# Patient Record
Sex: FEMALE | Race: WHITE | NOT HISPANIC OR LATINO
[De-identification: names, ages, dates, MRNs, and addresses within clinical notes are randomized per-mention and may not be internally consistent; named-entity substitution may affect disease eponyms.]

---

## 2017-11-17 ENCOUNTER — APPOINTMENT (OUTPATIENT)
Dept: FAMILY MEDICINE | Facility: CLINIC | Age: 37
End: 2017-11-17
Payer: COMMERCIAL

## 2017-11-17 VITALS
BODY MASS INDEX: 24.55 KG/M2 | DIASTOLIC BLOOD PRESSURE: 70 MMHG | HEIGHT: 61 IN | WEIGHT: 130 LBS | SYSTOLIC BLOOD PRESSURE: 110 MMHG

## 2017-11-17 LAB — CYTOLOGY CVX/VAG DOC THIN PREP: NORMAL

## 2017-11-17 PROCEDURE — 36415 COLL VENOUS BLD VENIPUNCTURE: CPT

## 2017-11-17 PROCEDURE — 99385 PREV VISIT NEW AGE 18-39: CPT | Mod: 25

## 2017-11-19 LAB
ALBUMIN SERPL ELPH-MCNC: 4.3 G/DL
ALP BLD-CCNC: 49 U/L
ALT SERPL-CCNC: 5 U/L
ANION GAP SERPL CALC-SCNC: 13 MMOL/L
APPEARANCE: CLEAR
AST SERPL-CCNC: 12 U/L
BACTERIA: ABNORMAL
BASOPHILS # BLD AUTO: 0.01 K/UL
BASOPHILS NFR BLD AUTO: 0.2 %
BILIRUB SERPL-MCNC: 0.6 MG/DL
BILIRUBIN URINE: NEGATIVE
BLOOD URINE: NEGATIVE
BUN SERPL-MCNC: 12 MG/DL
CALCIUM SERPL-MCNC: 9.8 MG/DL
CHLORIDE SERPL-SCNC: 104 MMOL/L
CHOLEST SERPL-MCNC: 178 MG/DL
CHOLEST/HDLC SERPL: 2.2 RATIO
CO2 SERPL-SCNC: 26 MMOL/L
COLOR: YELLOW
CREAT SERPL-MCNC: 0.72 MG/DL
EOSINOPHIL # BLD AUTO: 0.05 K/UL
EOSINOPHIL NFR BLD AUTO: 0.9 %
GLUCOSE QUALITATIVE U: NEGATIVE MG/DL
GLUCOSE SERPL-MCNC: 100 MG/DL
HBA1C MFR BLD HPLC: 5.1 %
HCT VFR BLD CALC: 40.4 %
HDLC SERPL-MCNC: 81 MG/DL
HGB BLD-MCNC: 12.8 G/DL
HIV1+2 AB SPEC QL IA.RAPID: NONREACTIVE
HYALINE CASTS: 0 /LPF
IMM GRANULOCYTES NFR BLD AUTO: 0.2 %
KETONES URINE: NEGATIVE
LDLC SERPL CALC-MCNC: 88 MG/DL
LEUKOCYTE ESTERASE URINE: NEGATIVE
LYMPHOCYTES # BLD AUTO: 2.11 K/UL
LYMPHOCYTES NFR BLD AUTO: 39.1 %
MAN DIFF?: NORMAL
MCHC RBC-ENTMCNC: 29 PG
MCHC RBC-ENTMCNC: 31.7 GM/DL
MCV RBC AUTO: 91.6 FL
MICROSCOPIC-UA: NORMAL
MONOCYTES # BLD AUTO: 0.29 K/UL
MONOCYTES NFR BLD AUTO: 5.4 %
NEUTROPHILS # BLD AUTO: 2.93 K/UL
NEUTROPHILS NFR BLD AUTO: 54.2 %
NITRITE URINE: NEGATIVE
PH URINE: 6.5
PLATELET # BLD AUTO: 186 K/UL
POTASSIUM SERPL-SCNC: 4.3 MMOL/L
PROT SERPL-MCNC: 7.3 G/DL
PROTEIN URINE: NEGATIVE MG/DL
RBC # BLD: 4.41 M/UL
RBC # FLD: 12.8 %
RED BLOOD CELLS URINE: 1 /HPF
SODIUM SERPL-SCNC: 143 MMOL/L
SPECIFIC GRAVITY URINE: 1.01
SQUAMOUS EPITHELIAL CELLS: 16 /HPF
TRIGL SERPL-MCNC: 44 MG/DL
TSH SERPL-ACNC: 0.73 UIU/ML
UROBILINOGEN URINE: NEGATIVE MG/DL
WBC # FLD AUTO: 5.4 K/UL
WHITE BLOOD CELLS URINE: 2 /HPF

## 2018-09-27 ENCOUNTER — APPOINTMENT (OUTPATIENT)
Dept: FAMILY MEDICINE | Facility: CLINIC | Age: 38
End: 2018-09-27
Payer: COMMERCIAL

## 2018-09-27 VITALS
DIASTOLIC BLOOD PRESSURE: 70 MMHG | SYSTOLIC BLOOD PRESSURE: 100 MMHG | HEIGHT: 61 IN | WEIGHT: 128 LBS | BODY MASS INDEX: 24.17 KG/M2

## 2018-09-27 PROCEDURE — 99213 OFFICE O/P EST LOW 20 MIN: CPT

## 2018-11-16 ENCOUNTER — APPOINTMENT (OUTPATIENT)
Dept: FAMILY MEDICINE | Facility: CLINIC | Age: 38
End: 2018-11-16
Payer: COMMERCIAL

## 2018-11-16 VITALS
DIASTOLIC BLOOD PRESSURE: 70 MMHG | SYSTOLIC BLOOD PRESSURE: 110 MMHG | WEIGHT: 128.19 LBS | BODY MASS INDEX: 24.2 KG/M2 | HEIGHT: 61 IN

## 2018-11-16 PROCEDURE — 99213 OFFICE O/P EST LOW 20 MIN: CPT

## 2018-11-16 NOTE — PHYSICAL EXAM
[Well Nourished] : well nourished [Regular Rhythm] : with a regular rhythm [Normal S1, S2] : normal S1 and S2 [Normal Gait] : normal gait [Normal Affect] : the affect was normal [Normal Insight/Judgement] : insight and judgment were intact [de-identified] : coarse

## 2018-11-16 NOTE — ASSESSMENT
[FreeTextEntry1] : 38 year old female  with diagnosis of upper respiratory infection. Patient was prescribed antibiotics, however was advised to hold and monitor symptoms.  If symptoms progress or worsen, patient was instructed to start taking the medications.  Once starting medications, patient was advised to finish entire course of antibiotics.  Patient was advised to drink plenty of fluids, rest and to call office or go to the ER immediately if any worsening of symptoms occur.\par

## 2018-11-16 NOTE — HISTORY OF PRESENT ILLNESS
[FreeTextEntry8] : 38 year old female here with complaints of cough, congestion and fever for a few hours.  Patients active medications, allergies and issues were all reviewed with the patient at time of visit.\par

## 2020-03-05 ENCOUNTER — APPOINTMENT (OUTPATIENT)
Dept: OBGYN | Facility: CLINIC | Age: 40
End: 2020-03-05

## 2020-03-19 ENCOUNTER — APPOINTMENT (OUTPATIENT)
Dept: ANTEPARTUM | Facility: CLINIC | Age: 40
End: 2020-03-19

## 2020-04-02 ENCOUNTER — APPOINTMENT (OUTPATIENT)
Dept: OBGYN | Facility: CLINIC | Age: 40
End: 2020-04-02

## 2020-04-30 ENCOUNTER — APPOINTMENT (OUTPATIENT)
Dept: ANTEPARTUM | Facility: CLINIC | Age: 40
End: 2020-04-30
Payer: COMMERCIAL

## 2020-04-30 PROCEDURE — 76811 OB US DETAILED SNGL FETUS: CPT

## 2020-04-30 PROCEDURE — 76817 TRANSVAGINAL US OBSTETRIC: CPT

## 2020-06-01 ENCOUNTER — APPOINTMENT (OUTPATIENT)
Dept: OBGYN | Facility: CLINIC | Age: 40
End: 2020-06-01

## 2020-06-15 ENCOUNTER — APPOINTMENT (OUTPATIENT)
Dept: OBGYN | Facility: CLINIC | Age: 40
End: 2020-06-15
Payer: COMMERCIAL

## 2020-06-15 PROCEDURE — 0502F SUBSEQUENT PRENATAL CARE: CPT

## 2020-07-06 ENCOUNTER — APPOINTMENT (OUTPATIENT)
Dept: OBGYN | Facility: CLINIC | Age: 40
End: 2020-07-06

## 2020-07-09 ENCOUNTER — APPOINTMENT (OUTPATIENT)
Dept: OBGYN | Facility: CLINIC | Age: 40
End: 2020-07-09
Payer: COMMERCIAL

## 2020-07-09 PROCEDURE — 76816 OB US FOLLOW-UP PER FETUS: CPT

## 2020-07-09 PROCEDURE — 76819 FETAL BIOPHYS PROFIL W/O NST: CPT

## 2020-07-27 ENCOUNTER — APPOINTMENT (OUTPATIENT)
Dept: OBGYN | Facility: CLINIC | Age: 40
End: 2020-07-27
Payer: COMMERCIAL

## 2020-07-27 PROCEDURE — 0502F SUBSEQUENT PRENATAL CARE: CPT

## 2020-08-13 ENCOUNTER — APPOINTMENT (OUTPATIENT)
Dept: OBGYN | Facility: CLINIC | Age: 40
End: 2020-08-13
Payer: COMMERCIAL

## 2020-08-13 PROCEDURE — 0502F SUBSEQUENT PRENATAL CARE: CPT

## 2020-08-20 ENCOUNTER — APPOINTMENT (OUTPATIENT)
Dept: ANTEPARTUM | Facility: CLINIC | Age: 40
End: 2020-08-20
Payer: COMMERCIAL

## 2020-08-20 PROCEDURE — 76818 FETAL BIOPHYS PROFILE W/NST: CPT

## 2020-08-27 ENCOUNTER — APPOINTMENT (OUTPATIENT)
Dept: ANTEPARTUM | Facility: CLINIC | Age: 40
End: 2020-08-27
Payer: COMMERCIAL

## 2020-08-27 PROCEDURE — 76818 FETAL BIOPHYS PROFILE W/NST: CPT

## 2020-09-03 ENCOUNTER — APPOINTMENT (OUTPATIENT)
Dept: ANTEPARTUM | Facility: CLINIC | Age: 40
End: 2020-09-03
Payer: COMMERCIAL

## 2020-09-03 PROCEDURE — 76818 FETAL BIOPHYS PROFILE W/NST: CPT

## 2020-09-10 ENCOUNTER — APPOINTMENT (OUTPATIENT)
Dept: ANTEPARTUM | Facility: CLINIC | Age: 40
End: 2020-09-10
Payer: COMMERCIAL

## 2020-09-10 PROCEDURE — 76818 FETAL BIOPHYS PROFILE W/NST: CPT

## 2020-09-10 PROCEDURE — 76816 OB US FOLLOW-UP PER FETUS: CPT

## 2020-09-14 ENCOUNTER — INPATIENT (INPATIENT)
Facility: HOSPITAL | Age: 40
LOS: 0 days | Discharge: HOME | End: 2020-09-15
Attending: OBSTETRICS & GYNECOLOGY | Admitting: OBSTETRICS & GYNECOLOGY
Payer: COMMERCIAL

## 2020-09-14 ENCOUNTER — TRANSCRIPTION ENCOUNTER (OUTPATIENT)
Age: 40
End: 2020-09-14

## 2020-09-14 VITALS — TEMPERATURE: 99 F

## 2020-09-14 DIAGNOSIS — Z3A.00 WEEKS OF GESTATION OF PREGNANCY NOT SPECIFIED: ICD-10-CM

## 2020-09-14 DIAGNOSIS — Z98.891 HISTORY OF UTERINE SCAR FROM PREVIOUS SURGERY: Chronic | ICD-10-CM

## 2020-09-14 DIAGNOSIS — O26.899 OTHER SPECIFIED PREGNANCY RELATED CONDITIONS, UNSPECIFIED TRIMESTER: ICD-10-CM

## 2020-09-14 LAB
BASOPHILS # BLD AUTO: 0.01 K/UL — SIGNIFICANT CHANGE UP (ref 0–0.2)
BASOPHILS NFR BLD AUTO: 0.1 % — SIGNIFICANT CHANGE UP (ref 0–2)
BLD GP AB SCN SERPL QL: NEGATIVE — SIGNIFICANT CHANGE UP
EOSINOPHIL # BLD AUTO: 0.01 K/UL — SIGNIFICANT CHANGE UP (ref 0–0.5)
EOSINOPHIL NFR BLD AUTO: 0.1 % — SIGNIFICANT CHANGE UP (ref 0–6)
GLUCOSE BLDC GLUCOMTR-MCNC: 136 MG/DL — HIGH (ref 70–99)
GLUCOSE BLDC GLUCOMTR-MCNC: 137 MG/DL — HIGH (ref 70–99)
HCT VFR BLD CALC: 36.8 % — SIGNIFICANT CHANGE UP (ref 34.5–45)
HGB BLD-MCNC: 12.3 G/DL — SIGNIFICANT CHANGE UP (ref 11.5–15.5)
IMM GRANULOCYTES NFR BLD AUTO: 0.5 % — SIGNIFICANT CHANGE UP (ref 0–1.5)
LYMPHOCYTES # BLD AUTO: 1.35 K/UL — SIGNIFICANT CHANGE UP (ref 1–3.3)
LYMPHOCYTES # BLD AUTO: 14.1 % — SIGNIFICANT CHANGE UP (ref 13–44)
MCHC RBC-ENTMCNC: 29.6 PG — SIGNIFICANT CHANGE UP (ref 27–34)
MCHC RBC-ENTMCNC: 33.4 % — SIGNIFICANT CHANGE UP (ref 32–36)
MCV RBC AUTO: 88.7 FL — SIGNIFICANT CHANGE UP (ref 80–100)
MONOCYTES # BLD AUTO: 0.51 K/UL — SIGNIFICANT CHANGE UP (ref 0–0.9)
MONOCYTES NFR BLD AUTO: 5.3 % — SIGNIFICANT CHANGE UP (ref 2–14)
NEUTROPHILS # BLD AUTO: 7.63 K/UL — HIGH (ref 1.8–7.4)
NEUTROPHILS NFR BLD AUTO: 79.9 % — HIGH (ref 43–77)
NRBC # FLD: 0 K/UL — SIGNIFICANT CHANGE UP (ref 0–0)
PLATELET # BLD AUTO: 127 K/UL — LOW (ref 150–400)
PMV BLD: 11.2 FL — SIGNIFICANT CHANGE UP (ref 7–13)
RBC # BLD: 4.15 M/UL — SIGNIFICANT CHANGE UP (ref 3.8–5.2)
RBC # FLD: 13.3 % — SIGNIFICANT CHANGE UP (ref 10.3–14.5)
RH IG SCN BLD-IMP: POSITIVE — SIGNIFICANT CHANGE UP
SARS-COV-2 RNA SPEC QL NAA+PROBE: SIGNIFICANT CHANGE UP
T PALLIDUM AB TITR SER: NEGATIVE — SIGNIFICANT CHANGE UP
WBC # BLD: 9.56 K/UL — SIGNIFICANT CHANGE UP (ref 3.8–10.5)
WBC # FLD AUTO: 9.56 K/UL — SIGNIFICANT CHANGE UP (ref 3.8–10.5)

## 2020-09-14 RX ORDER — OXYTOCIN 10 UNIT/ML
333.33 VIAL (ML) INJECTION
Qty: 20 | Refills: 0 | Status: DISCONTINUED | OUTPATIENT
Start: 2020-09-14 | End: 2020-09-14

## 2020-09-14 RX ORDER — SODIUM CHLORIDE 9 MG/ML
1000 INJECTION, SOLUTION INTRAVENOUS
Refills: 0 | Status: DISCONTINUED | OUTPATIENT
Start: 2020-09-14 | End: 2020-09-14

## 2020-09-14 RX ORDER — DIPHENHYDRAMINE HCL 50 MG
25 CAPSULE ORAL EVERY 6 HOURS
Refills: 0 | Status: DISCONTINUED | OUTPATIENT
Start: 2020-09-14 | End: 2020-09-15

## 2020-09-14 RX ORDER — OXYCODONE HYDROCHLORIDE 5 MG/1
5 TABLET ORAL
Refills: 0 | Status: DISCONTINUED | OUTPATIENT
Start: 2020-09-14 | End: 2020-09-15

## 2020-09-14 RX ORDER — AER TRAVELER 0.5 G/1
1 SOLUTION RECTAL; TOPICAL EVERY 4 HOURS
Refills: 0 | Status: DISCONTINUED | OUTPATIENT
Start: 2020-09-14 | End: 2020-09-15

## 2020-09-14 RX ORDER — TETANUS TOXOID, REDUCED DIPHTHERIA TOXOID AND ACELLULAR PERTUSSIS VACCINE, ADSORBED 5; 2.5; 8; 8; 2.5 [IU]/.5ML; [IU]/.5ML; UG/.5ML; UG/.5ML; UG/.5ML
0.5 SUSPENSION INTRAMUSCULAR ONCE
Refills: 0 | Status: DISCONTINUED | OUTPATIENT
Start: 2020-09-14 | End: 2020-09-15

## 2020-09-14 RX ORDER — SIMETHICONE 80 MG/1
80 TABLET, CHEWABLE ORAL EVERY 4 HOURS
Refills: 0 | Status: DISCONTINUED | OUTPATIENT
Start: 2020-09-14 | End: 2020-09-15

## 2020-09-14 RX ORDER — INFLUENZA VIRUS VACCINE 15; 15; 15; 15 UG/.5ML; UG/.5ML; UG/.5ML; UG/.5ML
0.5 SUSPENSION INTRAMUSCULAR ONCE
Refills: 0 | Status: DISCONTINUED | OUTPATIENT
Start: 2020-09-14 | End: 2020-09-15

## 2020-09-14 RX ORDER — IBUPROFEN 200 MG
600 TABLET ORAL EVERY 6 HOURS
Refills: 0 | Status: DISCONTINUED | OUTPATIENT
Start: 2020-09-14 | End: 2020-09-15

## 2020-09-14 RX ORDER — ACETAMINOPHEN 500 MG
3 TABLET ORAL
Qty: 0 | Refills: 0 | DISCHARGE
Start: 2020-09-14

## 2020-09-14 RX ORDER — MAGNESIUM HYDROXIDE 400 MG/1
30 TABLET, CHEWABLE ORAL
Refills: 0 | Status: DISCONTINUED | OUTPATIENT
Start: 2020-09-14 | End: 2020-09-15

## 2020-09-14 RX ORDER — OXYCODONE HYDROCHLORIDE 5 MG/1
5 TABLET ORAL ONCE
Refills: 0 | Status: DISCONTINUED | OUTPATIENT
Start: 2020-09-14 | End: 2020-09-15

## 2020-09-14 RX ORDER — KETOROLAC TROMETHAMINE 30 MG/ML
30 SYRINGE (ML) INJECTION ONCE
Refills: 0 | Status: DISCONTINUED | OUTPATIENT
Start: 2020-09-14 | End: 2020-09-15

## 2020-09-14 RX ORDER — HYDROCORTISONE 1 %
1 OINTMENT (GRAM) TOPICAL EVERY 6 HOURS
Refills: 0 | Status: DISCONTINUED | OUTPATIENT
Start: 2020-09-14 | End: 2020-09-15

## 2020-09-14 RX ORDER — LANOLIN
1 OINTMENT (GRAM) TOPICAL EVERY 6 HOURS
Refills: 0 | Status: DISCONTINUED | OUTPATIENT
Start: 2020-09-14 | End: 2020-09-15

## 2020-09-14 RX ORDER — BENZOCAINE 10 %
1 GEL (GRAM) MUCOUS MEMBRANE EVERY 6 HOURS
Refills: 0 | Status: DISCONTINUED | OUTPATIENT
Start: 2020-09-14 | End: 2020-09-15

## 2020-09-14 RX ORDER — IBUPROFEN 200 MG
1 TABLET ORAL
Qty: 0 | Refills: 0 | DISCHARGE
Start: 2020-09-14

## 2020-09-14 RX ORDER — DIBUCAINE 1 %
1 OINTMENT (GRAM) RECTAL EVERY 6 HOURS
Refills: 0 | Status: DISCONTINUED | OUTPATIENT
Start: 2020-09-14 | End: 2020-09-15

## 2020-09-14 RX ORDER — PRAMOXINE HYDROCHLORIDE 150 MG/15G
1 AEROSOL, FOAM RECTAL EVERY 4 HOURS
Refills: 0 | Status: DISCONTINUED | OUTPATIENT
Start: 2020-09-14 | End: 2020-09-15

## 2020-09-14 RX ORDER — SODIUM CHLORIDE 9 MG/ML
3 INJECTION INTRAMUSCULAR; INTRAVENOUS; SUBCUTANEOUS EVERY 8 HOURS
Refills: 0 | Status: DISCONTINUED | OUTPATIENT
Start: 2020-09-14 | End: 2020-09-15

## 2020-09-14 RX ORDER — ACETAMINOPHEN 500 MG
975 TABLET ORAL
Refills: 0 | Status: DISCONTINUED | OUTPATIENT
Start: 2020-09-14 | End: 2020-09-15

## 2020-09-14 RX ORDER — IBUPROFEN 200 MG
600 TABLET ORAL EVERY 6 HOURS
Refills: 0 | Status: COMPLETED | OUTPATIENT
Start: 2020-09-14 | End: 2021-08-13

## 2020-09-14 RX ADMIN — Medication 600 MILLIGRAM(S): at 16:00

## 2020-09-14 RX ADMIN — Medication 600 MILLIGRAM(S): at 23:09

## 2020-09-14 RX ADMIN — Medication 600 MILLIGRAM(S): at 15:32

## 2020-09-14 RX ADMIN — Medication 1000 MILLIUNIT(S)/MIN: at 11:18

## 2020-09-14 RX ADMIN — SODIUM CHLORIDE 3 MILLILITER(S): 9 INJECTION INTRAMUSCULAR; INTRAVENOUS; SUBCUTANEOUS at 22:00

## 2020-09-14 RX ADMIN — SODIUM CHLORIDE 3 MILLILITER(S): 9 INJECTION INTRAMUSCULAR; INTRAVENOUS; SUBCUTANEOUS at 15:49

## 2020-09-14 NOTE — OB PROVIDER DELIVERY SUMMARY - NSPROVIDERDELIVERYNOTE_OBGYN_ALL_OB_FT
Spontaneous vaginal delivery of liveborn infant from JEZ position. Head, shoulders, and body delivered easily. Infant was suctioned. No mec. Cord was clamped and cut and infant was passed to mother. Placenta delivered intact with a 3 vessel cord. Fundal massage was given and uterine fundus was found to be firm. Vaginal exam revealed an intact cervix, vaginal walls and sulci. Patient had a 2nd degree laceration in the perineum that was repaired with 2.0 chromic suture. Excellent hemostasis was noted. Patient was stable and went to recovery. Count was correct x 2.

## 2020-09-14 NOTE — CHART NOTE - NSCHARTNOTEFT_GEN_A_CORE
NP note    Pt seen for increased pressure. Refused epidural.     T(C): 37 (14 Sep 2020 08:00), Max: 37.0 (14 Sep 2020 07:00)  T(F): 98.6 (14 Sep 2020 08:00), Max: 98.6 (14 Sep 2020 07:00)  HR: 98 (14 Sep 2020 08:00) (87 - 100)  BP: 91/47 (14 Sep 2020 08:00) (91/47 - 110/59)  RR: 15 (14 Sep 2020 08:00) (15 - 16)  /moderate variability/+ accels/early decels  [place] q2-3min  SVE 10/100/-1    Notified Dr. Gil who will come evaluate pt  Anticipate     ARNIE wise

## 2020-09-14 NOTE — OB RN DELIVERY SUMMARY - NS_SEPSISRSKCALC_OBGYN_ALL_OB_FT
EOS calculated successfully. EOS Risk Factor: 0.5/1000 live births ([facility] national incidence); GA=38w5d; Temp=98.6; ROM=5.683; GBS='Unknown'; Antibiotics='No antibiotics or any antibiotics < 2 hrs prior to birth'

## 2020-09-14 NOTE — OB RN PATIENT PROFILE - ALERT: PERTINENT HISTORY
20 Week Level II Sonogram/1st Trimester Sonogram/Follow up Sonogram for Growth/Fetal Non-Stress Test (NST)

## 2020-09-14 NOTE — OB PROVIDER TRIAGE NOTE - NSHPPHYSICALEXAM_GEN_ALL_CORE
speculum exam - positive pool/dye/fern  sve 7/90/-3    TAS - cephalic presentation confirmed  abdomen soft and nontender    Vital Signs Last 24 Hrs  T(C): 37 (14 Sep 2020 07:03), Max: 37.0 (14 Sep 2020 07:00)  T(F): 98.6 (14 Sep 2020 07:03), Max: 98.6 (14 Sep 2020 07:00)  HR: 87 (14 Sep 2020 07:28) (87 - 100)  BP: 91/54 (14 Sep 2020 07:28) (91/54 - 110/59)  BP(mean): --  RR: 16 (14 Sep 2020 07:03) (16 - 16)  SpO2: --

## 2020-09-14 NOTE — OB PROVIDER TRIAGE NOTE - HISTORY OF PRESENT ILLNESS
39 y.o.  @ 38.5 weeks c/o lof @ 445am and contractions. Pt denies VB. Pt desires to tolac. Pt declines epidural in labor.

## 2020-09-14 NOTE — DISCHARGE NOTE OB - CARE PLAN
Principal Discharge DX:	Vaginal delivery  Goal:	normal postpartum course  Assessment and plan of treatment:	normal diet and activity  Secondary Diagnosis:	Previous  section

## 2020-09-14 NOTE — DISCHARGE NOTE OB - MEDICATION SUMMARY - MEDICATIONS TO TAKE
I will START or STAY ON the medications listed below when I get home from the hospital:    ibuprofen 600 mg oral tablet  -- 1 tab(s) by mouth every 6 hours  -- Indication: For Vaginal delivery    acetaminophen 325 mg oral tablet  -- 3 tab(s) by mouth   -- Indication: For Vaginal delivery    Prenatal Multivitamins with Folic Acid 1 mg oral tablet  -- 1 tab(s) by mouth once a day  -- Indication: For Vaginal delivery

## 2020-09-14 NOTE — DISCHARGE NOTE OB - PATIENT PORTAL LINK FT
You can access the FollowMyHealth Patient Portal offered by [facility] by registering at the following website: [email or web address]. By joining [facility]’s FollowMyHealth portal, you will also be able to view your health information using other applications (apps) compatible with our system.

## 2020-09-14 NOTE — OB PROVIDER TRIAGE NOTE - NSOBPROVIDERNOTE_OBGYN_ALL_OB_FT
d/w Dr. Gil  pt admitted for labor  routine orders placed  prenatal chart reviewed  gbs unknown  covid 19 specimen sent

## 2020-09-14 NOTE — OB PROVIDER TRIAGE NOTE - NS_OBGYNHISTORY_OBGYN_ALL_OB_FT
2011 FTC/S 5lb 7oz - failure to progress  2013 Failed TOLAC 7lb 6oz GDMA1  2016 FT  5lb 6oz GDMA1    Current AP significant for GDMA1

## 2020-09-14 NOTE — DISCHARGE NOTE OB - MATERIALS PROVIDED
[facility] [place] Screening Program/Breastfeeding Guide and Packet/Birth Certificate Instructions/Discharge Medication Information for Patients and Families Pocket Guide/Back To Sleep Handout/  Immunization Record/Breastfeeding Log/Shaken Baby Prevention Handout/[facility] Hearing Screen Program/Guide to Postpartum Care

## 2020-09-15 VITALS
RESPIRATION RATE: 16 BRPM | SYSTOLIC BLOOD PRESSURE: 95 MMHG | OXYGEN SATURATION: 99 % | TEMPERATURE: 98 F | HEART RATE: 85 BPM | DIASTOLIC BLOOD PRESSURE: 65 MMHG

## 2020-09-15 RX ADMIN — Medication 600 MILLIGRAM(S): at 09:00

## 2020-09-15 RX ADMIN — Medication 600 MILLIGRAM(S): at 00:00

## 2020-09-15 RX ADMIN — Medication 600 MILLIGRAM(S): at 08:00

## 2020-09-15 NOTE — PROGRESS NOTE ADULT - ASSESSMENT
A/P: 40yo PPD#1 s/p .  Patient is stable and doing well post-partum.   - Pain well controlled, continue current pain regimen  - Increase ambulation, SCDs when not ambulating  - Continue regular diet  - Discharge planning    CRYS Nevarez, PGY1

## 2020-09-16 ENCOUNTER — NON-APPOINTMENT (OUTPATIENT)
Age: 40
End: 2020-09-16

## 2020-09-16 RX ORDER — PROMETHAZINE HYDROCHLORIDE AND DEXTROMETHORPHAN HYDROBROMIDE ORAL SOLUTION 15; 6.25 MG/5ML; MG/5ML
6.25-15 SOLUTION ORAL
Qty: 150 | Refills: 0 | Status: DISCONTINUED | COMMUNITY
Start: 2018-11-16 | End: 2020-09-16

## 2020-09-16 RX ORDER — AZITHROMYCIN 250 MG/1
250 TABLET, FILM COATED ORAL
Qty: 1 | Refills: 0 | Status: DISCONTINUED | COMMUNITY
Start: 2018-11-16 | End: 2020-09-16

## 2020-09-16 RX ORDER — IBUPROFEN 600 MG/1
600 TABLET, FILM COATED ORAL EVERY 6 HOURS
Refills: 0 | Status: ACTIVE | COMMUNITY
Start: 2020-09-16

## 2020-09-16 RX ORDER — MONTELUKAST 10 MG/1
10 TABLET, FILM COATED ORAL DAILY
Qty: 14 | Refills: 0 | Status: DISCONTINUED | COMMUNITY
Start: 2018-09-27 | End: 2020-09-16

## 2020-09-16 RX ORDER — ACETAMINOPHEN 325 MG/1
325 TABLET ORAL
Refills: 0 | Status: ACTIVE | COMMUNITY
Start: 2020-09-16

## 2020-09-16 RX ORDER — VITAMIN A ACETATE, .BETA.-CAROTENE, ASCORBIC ACID, CHOLECALCIFEROL, .ALPHA.-TOCOPHEROL ACETATE, DL-, THIAMINE MONONITRATE, RIBOFLAVIN, NIACINAMIDE, PYRIDOXINE HYDROCHLORIDE, FOLIC ACID, CYANOCOBALAMIN, CALCIUM CARBONATE, FERROUS FUMARATE, ZINC OXIDE, AND CUPRIC OXIDE 2000; 2000; 120; 400; 22; 1.84; 3; 20; 10; 1; 12; 200; 27; 25; 2 [IU]/1; [IU]/1; MG/1; [IU]/1; MG/1; MG/1; MG/1; MG/1; MG/1; MG/1; UG/1; MG/1; MG/1; MG/1; MG/1
27-1 TABLET ORAL
Refills: 0 | Status: ACTIVE | COMMUNITY
Start: 2020-09-16

## 2020-09-17 ENCOUNTER — APPOINTMENT (OUTPATIENT)
Dept: ANTEPARTUM | Facility: CLINIC | Age: 40
End: 2020-09-17

## 2020-09-29 ENCOUNTER — NON-APPOINTMENT (OUTPATIENT)
Age: 40
End: 2020-09-29

## 2020-10-29 ENCOUNTER — APPOINTMENT (OUTPATIENT)
Dept: OBGYN | Facility: CLINIC | Age: 40
End: 2020-10-29
Payer: COMMERCIAL

## 2020-10-29 PROCEDURE — 0503F POSTPARTUM CARE VISIT: CPT

## 2021-07-09 NOTE — DISCHARGE NOTE OB - PERSISTENT HEADACHE, BLURRED VISION
Report called to Elma Segovia RN.  Reviewed all discharge instructions and very soft diet order.  Will discharge back when appropriate.   Statement Selected

## 2021-09-01 ENCOUNTER — APPOINTMENT (OUTPATIENT)
Dept: FAMILY MEDICINE | Facility: CLINIC | Age: 41
End: 2021-09-01
Payer: COMMERCIAL

## 2021-09-01 VITALS
BODY MASS INDEX: 24.92 KG/M2 | TEMPERATURE: 97.8 F | WEIGHT: 132 LBS | HEIGHT: 61 IN | SYSTOLIC BLOOD PRESSURE: 110 MMHG | OXYGEN SATURATION: 98 % | DIASTOLIC BLOOD PRESSURE: 70 MMHG | RESPIRATION RATE: 17 BRPM | HEART RATE: 79 BPM

## 2021-09-01 PROCEDURE — 99213 OFFICE O/P EST LOW 20 MIN: CPT

## 2021-09-01 RX ORDER — METHYLPREDNISOLONE 4 MG/1
4 TABLET ORAL
Qty: 21 | Refills: 0 | Status: ACTIVE | COMMUNITY
Start: 2021-09-01 | End: 1900-01-01

## 2021-09-01 NOTE — ASSESSMENT
[FreeTextEntry1] : uri\par The symptoms that are occurring are most likely secondary to virus, therefore antibiotics are deferred at this time. Patient was told to rest, hydrate and treat symptoms as necessary. May use tylenol/ibuprofen as necessary for symptomatic relief.  If symptoms worsen or do not improve return to this office, seek care or go directly to the ER.\par \par will try zyrtec\par medrol

## 2021-09-01 NOTE — HISTORY OF PRESENT ILLNESS
[FreeTextEntry8] : 40 year old female here with complaints of cough for two months, mostly at night and in the morning.  Patients active medications, allergies and issues were all reviewed with the patient at time of visit.\par \par

## 2021-09-01 NOTE — PHYSICAL EXAM
[Well Nourished] : well nourished [Well Developed] : well developed [Regular Rhythm] : with a regular rhythm [Normal S1, S2] : normal S1 and S2 [Normal Gait] : normal gait [Normal Affect] : the affect was normal [Normal Insight/Judgement] : insight and judgment were intact [de-identified] : coarse

## 2021-09-02 ENCOUNTER — NON-APPOINTMENT (OUTPATIENT)
Age: 41
End: 2021-09-02

## 2021-09-02 DIAGNOSIS — Z78.9 OTHER SPECIFIED HEALTH STATUS: ICD-10-CM

## 2021-09-02 DIAGNOSIS — Z3A.38 38 WEEKS GESTATION OF PREGNANCY: ICD-10-CM

## 2021-09-02 DIAGNOSIS — O34.219 MATERNAL CARE FOR UNSPECIFIED TYPE SCAR FROM PREVIOUS CESAREAN DELIVERY: ICD-10-CM

## 2021-09-02 DIAGNOSIS — Z82.49 FAMILY HISTORY OF ISCHEMIC HEART DISEASE AND OTHER DISEASES OF THE CIRCULATORY SYSTEM: ICD-10-CM

## 2021-09-02 DIAGNOSIS — Z83.3 FAMILY HISTORY OF DIABETES MELLITUS: ICD-10-CM

## 2021-10-06 ENCOUNTER — APPOINTMENT (OUTPATIENT)
Dept: OBGYN | Facility: CLINIC | Age: 41
End: 2021-10-06
Payer: COMMERCIAL

## 2021-10-06 VITALS — SYSTOLIC BLOOD PRESSURE: 105 MMHG | WEIGHT: 133 LBS | BODY MASS INDEX: 25.13 KG/M2 | DIASTOLIC BLOOD PRESSURE: 71 MMHG

## 2021-10-06 DIAGNOSIS — Z01.419 ENCOUNTER FOR GYNECOLOGICAL EXAMINATION (GENERAL) (ROUTINE) W/OUT ABNORMAL FINDINGS: ICD-10-CM

## 2021-10-06 DIAGNOSIS — Z86.19 PERSONAL HISTORY OF OTHER INFECTIOUS AND PARASITIC DISEASES: ICD-10-CM

## 2021-10-06 DIAGNOSIS — Z11.3 ENCOUNTER FOR SCREENING FOR INFECTIONS WITH A PREDOMINANTLY SEXUAL MODE OF TRANSMISSION: ICD-10-CM

## 2021-10-06 PROCEDURE — 99214 OFFICE O/P EST MOD 30 MIN: CPT

## 2021-10-06 NOTE — PLAN
[FreeTextEntry1] : 40 year  y/o female presenting for annual exam:\par -f/u pap and GC/CT\par -Requisition given for mammogram and breast sono\par -Contraception: none-wants pregnancy soon\par -f/u PRN\par \par

## 2021-10-06 NOTE — HISTORY OF PRESENT ILLNESS
[FreeTextEntry1] : Patient is a 41 y/o presenting for an annual visit. She is feeling well and is without complaints. She denies vaginal itching, odor and discharge. Denies urinary urgency, frequency and dysuria.\par  [PapSmeardate] : 2019 [PGHxTotal] : 4 [PGHxPremature] : 0 [[facility]] : 4 [PGHxAbortions] : 0 [[facility]Living] : 4 [PGHxABInduced] : 0 [PGHxEctopic] : 0 [PGHxABSpont] : 0 [PGHxMultBirths] : 0

## 2021-10-07 LAB — HPV HIGH+LOW RISK DNA PNL CVX: NOT DETECTED

## 2021-10-11 LAB
C TRACH RRNA SPEC QL NAA+PROBE: NOT DETECTED
HPV 16 E6+E7 MRNA CVX QL NAA+PROBE: NOT DETECTED
HPV18+45 E6+E7 MRNA CVX QL NAA+PROBE: NOT DETECTED
N GONORRHOEA RRNA SPEC QL NAA+PROBE: NOT DETECTED
SOURCE AMPLIFICATION: NORMAL

## 2021-10-12 ENCOUNTER — APPOINTMENT (OUTPATIENT)
Dept: FAMILY MEDICINE | Facility: CLINIC | Age: 41
End: 2021-10-12
Payer: COMMERCIAL

## 2021-10-12 VITALS
SYSTOLIC BLOOD PRESSURE: 96 MMHG | RESPIRATION RATE: 16 BRPM | HEIGHT: 61 IN | OXYGEN SATURATION: 98 % | HEART RATE: 82 BPM | DIASTOLIC BLOOD PRESSURE: 70 MMHG | BODY MASS INDEX: 24.92 KG/M2 | WEIGHT: 132 LBS | TEMPERATURE: 97.7 F

## 2021-10-12 LAB — CYTOLOGY CVX/VAG DOC THIN PREP: NORMAL

## 2021-10-12 PROCEDURE — 99396 PREV VISIT EST AGE 40-64: CPT | Mod: 25

## 2021-10-13 LAB
25(OH)D3 SERPL-MCNC: 26.4 NG/ML
ALBUMIN SERPL ELPH-MCNC: 4.5 G/DL
ALP BLD-CCNC: 60 U/L
ALT SERPL-CCNC: 8 U/L
ANION GAP SERPL CALC-SCNC: 10 MMOL/L
APPEARANCE: CLEAR
AST SERPL-CCNC: 15 U/L
BACTERIA: ABNORMAL
BASOPHILS # BLD AUTO: 0.04 K/UL
BASOPHILS NFR BLD AUTO: 1 %
BILIRUB SERPL-MCNC: 0.3 MG/DL
BILIRUBIN URINE: NEGATIVE
BLOOD URINE: NEGATIVE
BUN SERPL-MCNC: 14 MG/DL
CALCIUM SERPL-MCNC: 9.5 MG/DL
CHLORIDE SERPL-SCNC: 104 MMOL/L
CHOLEST SERPL-MCNC: 173 MG/DL
CO2 SERPL-SCNC: 26 MMOL/L
COLOR: COLORLESS
CREAT SERPL-MCNC: 0.73 MG/DL
EOSINOPHIL # BLD AUTO: 0.07 K/UL
EOSINOPHIL NFR BLD AUTO: 1.7 %
ESTIMATED AVERAGE GLUCOSE: 103 MG/DL
GLUCOSE QUALITATIVE U: NEGATIVE
GLUCOSE SERPL-MCNC: 99 MG/DL
HBA1C MFR BLD HPLC: 5.2 %
HCT VFR BLD CALC: 39.2 %
HDLC SERPL-MCNC: 68 MG/DL
HGB BLD-MCNC: 12.6 G/DL
HYALINE CASTS: 0 /LPF
IMM GRANULOCYTES NFR BLD AUTO: 0.2 %
KETONES URINE: NEGATIVE
LDLC SERPL CALC-MCNC: 90 MG/DL
LEUKOCYTE ESTERASE URINE: NEGATIVE
LYMPHOCYTES # BLD AUTO: 1.4 K/UL
LYMPHOCYTES NFR BLD AUTO: 33.4 %
MAN DIFF?: NORMAL
MCHC RBC-ENTMCNC: 29 PG
MCHC RBC-ENTMCNC: 32.1 GM/DL
MCV RBC AUTO: 90.1 FL
MICROSCOPIC-UA: NORMAL
MONOCYTES # BLD AUTO: 0.31 K/UL
MONOCYTES NFR BLD AUTO: 7.4 %
NEUTROPHILS # BLD AUTO: 2.36 K/UL
NEUTROPHILS NFR BLD AUTO: 56.3 %
NITRITE URINE: NEGATIVE
NONHDLC SERPL-MCNC: 105 MG/DL
PH URINE: 6.5
PLATELET # BLD AUTO: 207 K/UL
POTASSIUM SERPL-SCNC: 4 MMOL/L
PROT SERPL-MCNC: 6.9 G/DL
PROTEIN URINE: NEGATIVE
RBC # BLD: 4.35 M/UL
RBC # FLD: 12.5 %
RED BLOOD CELLS URINE: 1 /HPF
SODIUM SERPL-SCNC: 141 MMOL/L
SPECIFIC GRAVITY URINE: 1
SQUAMOUS EPITHELIAL CELLS: 4 /HPF
TRIGL SERPL-MCNC: 74 MG/DL
TSH SERPL-ACNC: 0.68 UIU/ML
UROBILINOGEN URINE: NORMAL
WBC # FLD AUTO: 4.19 K/UL
WHITE BLOOD CELLS URINE: 3 /HPF

## 2022-06-23 ENCOUNTER — NON-APPOINTMENT (OUTPATIENT)
Age: 42
End: 2022-06-23

## 2022-06-27 ENCOUNTER — APPOINTMENT (OUTPATIENT)
Dept: FAMILY MEDICINE | Facility: CLINIC | Age: 42
End: 2022-06-27
Payer: COMMERCIAL

## 2022-06-27 DIAGNOSIS — U07.1 COVID-19: ICD-10-CM

## 2022-06-27 PROCEDURE — 99214 OFFICE O/P EST MOD 30 MIN: CPT | Mod: 95

## 2022-06-27 NOTE — HISTORY OF PRESENT ILLNESS
[Home] : at home, [unfilled] , at the time of the visit. [Medical Office: ([facility])___] : at the medical office located in  [Verbal consent obtained from patient] : the patient, [unfilled] [FreeTextEntry8] : 41 year old female here with complaints of throat pain, for a few days. went to urgent care.  tested positive for covid. Patients active medications, allergies and issues were all reviewed with the patient at time of visit.\par \par \par

## 2022-06-27 NOTE — ASSESSMENT
[FreeTextEntry1] : covid 19\par went to urgent care\par was told she has enlarged thyroid\par patient reports that she had t4, which was 3.1 which is elevated\par reviewed possibilities, currently patient is feeling better.  throat is hurting her less\par at this time, advised to monitor symptoms\par definitely repeat tsh, t4 in 4-6 weeks\par conduct thyroid sonogram\par call office earlier if any issues arise or any worsening of symptoms, but as long as patient is progressing positively, and has no other issues, can repeat bw in 4-6 weeks\par

## 2022-07-21 ENCOUNTER — APPOINTMENT (OUTPATIENT)
Dept: FAMILY MEDICINE | Facility: CLINIC | Age: 42
End: 2022-07-21

## 2022-07-21 ENCOUNTER — LABORATORY RESULT (OUTPATIENT)
Age: 42
End: 2022-07-21

## 2022-07-21 VITALS
SYSTOLIC BLOOD PRESSURE: 100 MMHG | BODY MASS INDEX: 23.6 KG/M2 | HEART RATE: 102 BPM | TEMPERATURE: 97.3 F | OXYGEN SATURATION: 97 % | WEIGHT: 125 LBS | HEIGHT: 61 IN | DIASTOLIC BLOOD PRESSURE: 60 MMHG

## 2022-07-21 PROCEDURE — 99213 OFFICE O/P EST LOW 20 MIN: CPT | Mod: 25

## 2022-07-21 PROCEDURE — 36415 COLL VENOUS BLD VENIPUNCTURE: CPT

## 2022-07-24 LAB
T3 SERPL-MCNC: 147 NG/DL
T3FREE SERPL-MCNC: 5.39 PG/ML
T3RU NFR SERPL: 0.8 TBI
T4 FREE SERPL-MCNC: 2.2 NG/DL
T4 SERPL-MCNC: 8.8 UG/DL
TSH SERPL-ACNC: <0.01 UIU/ML

## 2022-12-07 ENCOUNTER — APPOINTMENT (OUTPATIENT)
Dept: OBGYN | Facility: CLINIC | Age: 42
End: 2022-12-07

## 2022-12-07 VITALS
SYSTOLIC BLOOD PRESSURE: 109 MMHG | HEIGHT: 61 IN | DIASTOLIC BLOOD PRESSURE: 72 MMHG | WEIGHT: 133 LBS | BODY MASS INDEX: 25.11 KG/M2

## 2022-12-07 DIAGNOSIS — N91.2 AMENORRHEA, UNSPECIFIED: ICD-10-CM

## 2022-12-07 PROCEDURE — 99213 OFFICE O/P EST LOW 20 MIN: CPT

## 2022-12-07 NOTE — HISTORY OF PRESENT ILLNESS
[Currently Active] : currently active [Men] : men [No] : No [FreeTextEntry1] : Patient is a 42 year old who presents after she had a positive home pregnancy test. LMP 10/4/22. She is endorsing occasional nausea and breast tenderness. She reports a Hx of GDM in prior pregnancies.  Unofficial sono reveals yolk sac with no +FHR as it is early GA. \par \par  C/S\par  C/S\par 2016 \par 2020

## 2022-12-07 NOTE — PLAN
[FreeTextEntry1] : 42 year old P4 presenting with amenorrhea\par -Rx sent for PNV \par -f/u thyroid bw done today \par -f/u HCG done today \par -RTO 1 week for viability sono, will do pap at next visit

## 2022-12-08 LAB
ABO + RH PNL BLD: NORMAL
ALBUMIN SERPL ELPH-MCNC: 4.5 G/DL
ALP BLD-CCNC: 43 U/L
ALT SERPL-CCNC: 12 U/L
AST SERPL-CCNC: 15 U/L
BASOPHILS # BLD AUTO: 0.02 K/UL
BASOPHILS NFR BLD AUTO: 0.2 %
BILIRUB DIRECT SERPL-MCNC: 0.1 MG/DL
BILIRUB INDIRECT SERPL-MCNC: 0.1 MG/DL
BILIRUB SERPL-MCNC: 0.2 MG/DL
EOSINOPHIL # BLD AUTO: 0.06 K/UL
EOSINOPHIL NFR BLD AUTO: 0.7 %
ESTIMATED AVERAGE GLUCOSE: 103 MG/DL
GLUCOSE SERPL-MCNC: 104 MG/DL
HBA1C MFR BLD HPLC: 5.2 %
HCG SERPL-MCNC: ABNORMAL MIU/ML
HCT VFR BLD CALC: 38.7 %
HGB BLD-MCNC: 12.5 G/DL
HIV1+2 AB SPEC QL IA.RAPID: NONREACTIVE
IMM GRANULOCYTES NFR BLD AUTO: 0.2 %
LYMPHOCYTES # BLD AUTO: 2.05 K/UL
LYMPHOCYTES NFR BLD AUTO: 22.9 %
MAN DIFF?: NORMAL
MCHC RBC-ENTMCNC: 30 PG
MCHC RBC-ENTMCNC: 32.3 GM/DL
MCV RBC AUTO: 92.8 FL
MONOCYTES # BLD AUTO: 0.61 K/UL
MONOCYTES NFR BLD AUTO: 6.8 %
NEUTROPHILS # BLD AUTO: 6.21 K/UL
NEUTROPHILS NFR BLD AUTO: 69.2 %
PLATELET # BLD AUTO: 261 K/UL
PROT SERPL-MCNC: 7.1 G/DL
RBC # BLD: 4.17 M/UL
RBC # FLD: 12.4 %
T4 FREE SERPL-MCNC: 1 NG/DL
TSH SERPL-ACNC: 0.62 UIU/ML
WBC # FLD AUTO: 8.97 K/UL

## 2022-12-09 LAB
APPEARANCE: CLEAR
BACTERIA: NEGATIVE
BILIRUBIN URINE: NEGATIVE
BLOOD URINE: NEGATIVE
COLOR: YELLOW
GLUCOSE QUALITATIVE U: NEGATIVE
HAV IGM SER QL: NONREACTIVE
HBV CORE IGM SER QL: NONREACTIVE
HBV SURFACE AB SER QL: NONREACTIVE
HBV SURFACE AG SER QL: NONREACTIVE
HBV SURFACE AG SER QL: NONREACTIVE
HCV AB SER QL: NONREACTIVE
HCV S/CO RATIO: 0.14 S/CO
HYALINE CASTS: 2 /LPF
KETONES URINE: NEGATIVE
LEAD BLD-MCNC: <1 UG/DL
LEUKOCYTE ESTERASE URINE: ABNORMAL
MEV IGG FLD QL IA: 141 AU/ML
MEV IGG+IGM SER-IMP: POSITIVE
MICROSCOPIC-UA: NORMAL
MUV AB SER-ACNC: POSITIVE
MUV IGG SER QL IA: 36.7 AU/ML
NITRITE URINE: NEGATIVE
PH URINE: 6.5
PROTEIN URINE: NORMAL
RED BLOOD CELLS URINE: 4 /HPF
RUBV IGG FLD-ACNC: 4.4 INDEX
RUBV IGG SER-IMP: POSITIVE
SPECIFIC GRAVITY URINE: 1.03
SQUAMOUS EPITHELIAL CELLS: 9 /HPF
UROBILINOGEN URINE: NORMAL
WHITE BLOOD CELLS URINE: 21 /HPF

## 2022-12-12 ENCOUNTER — NON-APPOINTMENT (OUTPATIENT)
Age: 42
End: 2022-12-12

## 2022-12-12 LAB
M TB IFN-G BLD-IMP: NEGATIVE
QUANTIFERON TB PLUS MITOGEN MINUS NIL: 9.9 IU/ML
QUANTIFERON TB PLUS NIL: 0.36 IU/ML
QUANTIFERON TB PLUS TB1 MINUS NIL: 0.08 IU/ML
QUANTIFERON TB PLUS TB2 MINUS NIL: 0.12 IU/ML

## 2022-12-13 LAB — BACTERIA UR CULT: NORMAL

## 2022-12-16 ENCOUNTER — NON-APPOINTMENT (OUTPATIENT)
Age: 42
End: 2022-12-16

## 2022-12-19 ENCOUNTER — APPOINTMENT (OUTPATIENT)
Dept: ANTEPARTUM | Facility: CLINIC | Age: 42
End: 2022-12-19

## 2022-12-19 ENCOUNTER — APPOINTMENT (OUTPATIENT)
Dept: OBGYN | Facility: CLINIC | Age: 42
End: 2022-12-19

## 2022-12-19 ENCOUNTER — TRANSCRIPTION ENCOUNTER (OUTPATIENT)
Age: 42
End: 2022-12-19

## 2022-12-19 VITALS
WEIGHT: 133 LBS | SYSTOLIC BLOOD PRESSURE: 114 MMHG | BODY MASS INDEX: 25.11 KG/M2 | HEIGHT: 61 IN | DIASTOLIC BLOOD PRESSURE: 79 MMHG

## 2022-12-19 PROCEDURE — 76801 OB US < 14 WKS SINGLE FETUS: CPT

## 2022-12-19 PROCEDURE — 99212 OFFICE O/P EST SF 10 MIN: CPT

## 2022-12-28 ENCOUNTER — APPOINTMENT (OUTPATIENT)
Dept: OBGYN | Facility: CLINIC | Age: 42
End: 2022-12-28
Payer: COMMERCIAL

## 2022-12-28 VITALS — BODY MASS INDEX: 25.41 KG/M2 | WEIGHT: 134.5 LBS | SYSTOLIC BLOOD PRESSURE: 115 MMHG | DIASTOLIC BLOOD PRESSURE: 68 MMHG

## 2022-12-28 PROCEDURE — 99213 OFFICE O/P EST LOW 20 MIN: CPT

## 2022-12-28 NOTE — HISTORY OF PRESENT ILLNESS
[FreeTextEntry1] : 43 yo female presents today after being diagnosed with a missed  on  22. Patient opted for expectant management at that time. Patient reports that she started bleeding today. Patient reports that bleeding is light at this time.

## 2022-12-28 NOTE — PLAN
[FreeTextEntry1] : 43 yo female for mab f/u:\par -sac seen on sono today by Dr. Berry\par -f/u Tuesday for gyn sono, if gestational sac has not yet passed will give mife/cytotec or schedule for d&C.

## 2023-01-03 ENCOUNTER — APPOINTMENT (OUTPATIENT)
Dept: ANTEPARTUM | Facility: CLINIC | Age: 43
End: 2023-01-03

## 2023-01-03 ENCOUNTER — APPOINTMENT (OUTPATIENT)
Dept: OBGYN | Facility: CLINIC | Age: 43
End: 2023-01-03
Payer: COMMERCIAL

## 2023-01-03 VITALS — SYSTOLIC BLOOD PRESSURE: 113 MMHG | WEIGHT: 133 LBS | BODY MASS INDEX: 25.13 KG/M2 | DIASTOLIC BLOOD PRESSURE: 73 MMHG

## 2023-01-03 PROCEDURE — 99214 OFFICE O/P EST MOD 30 MIN: CPT

## 2023-01-03 RX ORDER — MISOPROSTOL 200 UG/1
200 TABLET ORAL
Qty: 8 | Refills: 0 | Status: ACTIVE | COMMUNITY
Start: 2023-01-03 | End: 1900-01-01

## 2023-01-03 RX ORDER — MIFEPRISTONE 200 MG/1
200 TABLET ORAL
Refills: 0 | Status: COMPLETED | OUTPATIENT
Start: 2023-01-03

## 2023-01-03 RX ADMIN — MIFEPRISTONE 1 MG: 200 TABLET ORAL at 00:00

## 2023-01-03 NOTE — PLAN
[FreeTextEntry1] : 43 y/o MAB confirmed by sonogram \par -patient counseled on options including expectant, medical and surgical management. Risks and benefits of each option reviewed in detail\par -Patient opting for medical management at this time.  0+. Mife 200 mg given in office. Discussed misoprostol 800mcg vaginally 24 hrs after mife with repeat insertion 12 hr later if bleeding has not begun . Written instructions given, patient should expect vaginal bleeding and cramping. Bleeding precautions given and patient instructed to call the emergency line or present to ED if soaking > 2 pads an hour for 2 hours or experiencing fevers, chills or signs of infection \par -HCG drawn \par -f/u 1-2 weeks for repeat sonogram to ensure completion\par

## 2023-01-03 NOTE — HISTORY OF PRESENT ILLNESS
[FreeTextEntry1] : Patient is a 41 y/o  Sonogram today showing same empty fetal sac, no FH noted (please see official report for full details). patient reports she is spotting occasionally. 0+ blood type

## 2023-01-04 ENCOUNTER — NON-APPOINTMENT (OUTPATIENT)
Age: 43
End: 2023-01-04

## 2023-01-04 LAB — HCG SERPL-MCNC: ABNORMAL MIU/ML

## 2023-01-05 ENCOUNTER — NON-APPOINTMENT (OUTPATIENT)
Age: 43
End: 2023-01-05

## 2023-01-05 NOTE — HISTORY OF PRESENT ILLNESS
[FreeTextEntry1] : Patient is a 42 year old  at 10w4d by LMP 10/4/22. Sonogram today showing fetus with CRL measuring 6 weeks, no FH noted (please see official report for full details).

## 2023-01-05 NOTE — PLAN
[FreeTextEntry1] : 42 year old P4 found to have missed .\par -Patient counseled on options including expectant, medical and surgical management\par -Risks and benefits of each option reviewed in detail\par -Patient understand the risks as discussed, including possibility for heavy bleeding, infection, uterine dehiscence, as well as possible incomplete evacuation\par \par -Patient opting for expectant management at this time. She will f/u in 1 week if no products have passed. Bleeding and infection precautions given and patient instructed to call emergency line or present to ED if soaking > 2 pads and hour for 2 hours or experiencing fevers, chills or signs of infection.\par \par -f/u 2 weeks for repeat sonogram to ensure completion

## 2023-01-09 LAB
APPEARANCE: CLEAR
BACTERIA: NEGATIVE
BILIRUBIN URINE: NEGATIVE
BLOOD URINE: ABNORMAL
COLOR: NORMAL
GLUCOSE QUALITATIVE U: NEGATIVE
HYALINE CASTS: 1 /LPF
KETONES URINE: NEGATIVE
LEUKOCYTE ESTERASE URINE: ABNORMAL
MICROSCOPIC-UA: NORMAL
NITRITE URINE: NEGATIVE
PH URINE: 7.5
PROTEIN URINE: NORMAL
RED BLOOD CELLS URINE: 15 /HPF
SPECIFIC GRAVITY URINE: 1.01
SQUAMOUS EPITHELIAL CELLS: 7 /HPF
UROBILINOGEN URINE: NORMAL
WHITE BLOOD CELLS URINE: 2 /HPF

## 2023-01-12 ENCOUNTER — APPOINTMENT (OUTPATIENT)
Dept: ANTEPARTUM | Facility: CLINIC | Age: 43
End: 2023-01-12
Payer: COMMERCIAL

## 2023-01-12 ENCOUNTER — APPOINTMENT (OUTPATIENT)
Dept: OBGYN | Facility: CLINIC | Age: 43
End: 2023-01-12
Payer: COMMERCIAL

## 2023-01-12 VITALS — DIASTOLIC BLOOD PRESSURE: 75 MMHG | SYSTOLIC BLOOD PRESSURE: 113 MMHG | WEIGHT: 131 LBS | BODY MASS INDEX: 24.75 KG/M2

## 2023-01-12 DIAGNOSIS — N83.8 OTHER NONINFLAMMATORY DISORDERS OF OVARY, FALLOPIAN TUBE AND BROAD LIGAMENT: ICD-10-CM

## 2023-01-12 PROCEDURE — 76857 US EXAM PELVIC LIMITED: CPT

## 2023-01-12 PROCEDURE — 99214 OFFICE O/P EST MOD 30 MIN: CPT

## 2023-01-12 PROCEDURE — 76830 TRANSVAGINAL US NON-OB: CPT

## 2023-01-12 RX ORDER — MIFEPRISTONE 200 MG/1
200 TABLET ORAL
Refills: 0 | Status: COMPLETED | OUTPATIENT
Start: 2023-01-12

## 2023-01-12 RX ORDER — MISOPROSTOL 200 UG/1
200 TABLET ORAL
Qty: 3 | Refills: 0 | Status: ACTIVE | COMMUNITY
Start: 2023-01-12 | End: 1900-01-01

## 2023-01-12 RX ADMIN — Medication 1 MG: at 00:00

## 2023-01-12 NOTE — HISTORY OF PRESENT ILLNESS
[FreeTextEntry1] : 42 yr old s/p mife and cytotec  1 week ago returned for f/u sonogram. patient reports she bleed very heavily for a few days passing large clots. Patient reports she almost went to ED due to the amount of VB. Patient reports she is still spotting. Denies any pain or discomfort at this time. 
none known

## 2023-01-12 NOTE — PLAN
[FreeTextEntry1] : 42 yr old s/p medical termination due to MAB at approximately 6 weeks gestation \par - patient took mife in the office last Wednesday followed by vaginal cytotec. patient reports bleeding heavily and currently still spotting. denies any pain or discomfort\par - GYN sono today revealed retained POC, with left adnexal mass. unable to determine whether this structure is a fibroid versus dermoid cyst or mass. Cancer markers sent, AFP, , LDH, inhibin B, HCG. \par - Dr Spain reviewed images, as well as Dr Berry remotely and were in agreement with the questionable left adnexal mass. Dr. Spain discussed this with the patient today plan to sent for MRI of pelvis to further define this questionable area. \par - mife given in office today, instructions given to patient to place 600 mcg of vaginal Cytotec.\par - patient to f/u with Dr Spain in 2 weeks for review of MRI of pelvis and probable removal of this questionable mass. all questions answered.

## 2023-01-13 LAB
AFP-TM SERPL-MCNC: 6 NG/ML
CANCER AG125 SERPL-ACNC: 33 U/ML
HCG SERPL-MCNC: 334 MIU/ML
LDH SERPL-CCNC: 149 U/L

## 2023-01-17 ENCOUNTER — NON-APPOINTMENT (OUTPATIENT)
Age: 43
End: 2023-01-17

## 2023-01-18 LAB — INHIBIN B: 137.9 PG/ML

## 2023-01-25 ENCOUNTER — APPOINTMENT (OUTPATIENT)
Dept: MRI IMAGING | Facility: CLINIC | Age: 43
End: 2023-01-25
Payer: COMMERCIAL

## 2023-01-25 PROCEDURE — A9585: CPT

## 2023-01-25 PROCEDURE — 72197 MRI PELVIS W/O & W/DYE: CPT

## 2023-01-26 ENCOUNTER — APPOINTMENT (OUTPATIENT)
Dept: OBGYN | Facility: CLINIC | Age: 43
End: 2023-01-26
Payer: COMMERCIAL

## 2023-01-26 VITALS — WEIGHT: 134 LBS | DIASTOLIC BLOOD PRESSURE: 77 MMHG | SYSTOLIC BLOOD PRESSURE: 123 MMHG | BODY MASS INDEX: 25.32 KG/M2

## 2023-01-26 DIAGNOSIS — O02.1 MISSED ABORTION: ICD-10-CM

## 2023-01-26 DIAGNOSIS — D36.9 BENIGN NEOPLASM, UNSPECIFIED SITE: ICD-10-CM

## 2023-01-26 PROCEDURE — 99214 OFFICE O/P EST MOD 30 MIN: CPT

## 2023-01-26 NOTE — HISTORY OF PRESENT ILLNESS
[FreeTextEntry1] : 41 y/o F presenting for follow up. Patient s/p MAB took misoprostol x2 doses. Patient also with incidental finding of left adnexal mass. Had MRI for better characterization. Patient feeling well, denies complaints. \par \par hc,45 () > 19,586 Miso (1/3) > 334 miso ()\par Tumor markers negative\par MRI pelvis (): 3.9cm left dermoid cyst

## 2023-01-26 NOTE — DISCUSSION/SUMMARY
[FreeTextEntry1] : 43 y/o F with MAB and left dermoid\par -reviewed diagnosis of left dermoid cyst - benign mass - will monitor given slow growing, asymptomatic - f/u imaging in 6 months\par -MAB: hcg downtrending - will repeat hcg today - if still elevated f/u 1 week for rpt hcg and sonogram

## 2023-01-27 ENCOUNTER — NON-APPOINTMENT (OUTPATIENT)
Age: 43
End: 2023-01-27

## 2023-02-01 ENCOUNTER — NON-APPOINTMENT (OUTPATIENT)
Age: 43
End: 2023-02-01

## 2023-02-02 LAB — HCG SERPL-MCNC: 12 MIU/ML

## 2023-02-10 ENCOUNTER — APPOINTMENT (OUTPATIENT)
Dept: ANTEPARTUM | Facility: CLINIC | Age: 43
End: 2023-02-10
Payer: COMMERCIAL

## 2023-02-10 ENCOUNTER — APPOINTMENT (OUTPATIENT)
Dept: OBGYN | Facility: CLINIC | Age: 43
End: 2023-02-10
Payer: COMMERCIAL

## 2023-02-10 VITALS
DIASTOLIC BLOOD PRESSURE: 67 MMHG | BODY MASS INDEX: 25.11 KG/M2 | HEART RATE: 99 BPM | WEIGHT: 133 LBS | SYSTOLIC BLOOD PRESSURE: 101 MMHG | HEIGHT: 61 IN

## 2023-02-10 PROCEDURE — 99213 OFFICE O/P EST LOW 20 MIN: CPT

## 2023-02-10 PROCEDURE — 76830 TRANSVAGINAL US NON-OB: CPT

## 2023-02-10 PROCEDURE — 76857 US EXAM PELVIC LIMITED: CPT

## 2023-02-10 NOTE — DISCUSSION/SUMMARY
[FreeTextEntry1] : 43 y/o F with MAB and left dermoid\par -GYN sono today showing EM 14mm no flow\par -MAB: hcg downtrending - will repeat hcg today \par -patient should have menses within 1 week - will call if she does not

## 2023-02-10 NOTE — HISTORY OF PRESENT ILLNESS
[FreeTextEntry1] : 41 y/o F presenting for follow up. Patient s/p MAB took misoprostol x2 doses.Patient feeling well, denies complaints. She has not yet had her period.\par \par hc,45 () > 19,586 Miso (1/3) > 334 miso () > 12 ()\par Tumor markers negative\par MRI pelvis (): 3.9cm left dermoid cyst

## 2023-02-14 ENCOUNTER — TRANSCRIPTION ENCOUNTER (OUTPATIENT)
Age: 43
End: 2023-02-14

## 2023-02-14 LAB — HCG SERPL-MCNC: 2 MIU/ML

## 2024-03-26 ENCOUNTER — APPOINTMENT (OUTPATIENT)
Dept: FAMILY MEDICINE | Facility: CLINIC | Age: 44
End: 2024-03-26
Payer: COMMERCIAL

## 2024-03-26 VITALS
HEART RATE: 93 BPM | OXYGEN SATURATION: 98 % | DIASTOLIC BLOOD PRESSURE: 77 MMHG | SYSTOLIC BLOOD PRESSURE: 122 MMHG | RESPIRATION RATE: 16 BRPM | BODY MASS INDEX: 27.21 KG/M2 | WEIGHT: 144 LBS | TEMPERATURE: 98 F

## 2024-03-26 DIAGNOSIS — Z00.00 ENCOUNTER FOR GENERAL ADULT MEDICAL EXAMINATION W/OUT ABNORMAL FINDINGS: ICD-10-CM

## 2024-03-26 DIAGNOSIS — R79.89 OTHER SPECIFIED ABNORMAL FINDINGS OF BLOOD CHEMISTRY: ICD-10-CM

## 2024-03-26 DIAGNOSIS — E04.9 NONTOXIC GOITER, UNSPECIFIED: ICD-10-CM

## 2024-03-26 PROCEDURE — 99396 PREV VISIT EST AGE 40-64: CPT | Mod: 25

## 2024-03-27 LAB
ALBUMIN SERPL ELPH-MCNC: 4.4 G/DL
ALP BLD-CCNC: 57 U/L
ALT SERPL-CCNC: 13 U/L
ANION GAP SERPL CALC-SCNC: 11 MMOL/L
APPEARANCE: CLEAR
AST SERPL-CCNC: 16 U/L
BACTERIA: NEGATIVE /HPF
BASOPHILS # BLD AUTO: 0.03 K/UL
BASOPHILS NFR BLD AUTO: 0.4 %
BILIRUB SERPL-MCNC: 0.2 MG/DL
BILIRUBIN URINE: NEGATIVE
BLOOD URINE: NEGATIVE
BUN SERPL-MCNC: 9 MG/DL
CALCIUM SERPL-MCNC: 9.4 MG/DL
CAST: 0 /LPF
CHLORIDE SERPL-SCNC: 103 MMOL/L
CHOLEST SERPL-MCNC: 184 MG/DL
CO2 SERPL-SCNC: 26 MMOL/L
COLOR: YELLOW
CREAT SERPL-MCNC: 0.62 MG/DL
EGFR: 112 ML/MIN/1.73M2
EOSINOPHIL # BLD AUTO: 0.09 K/UL
EOSINOPHIL NFR BLD AUTO: 1.2 %
EPITHELIAL CELLS: 3 /HPF
ESTIMATED AVERAGE GLUCOSE: 103 MG/DL
GLUCOSE QUALITATIVE U: NEGATIVE MG/DL
GLUCOSE SERPL-MCNC: 102 MG/DL
HBA1C MFR BLD HPLC: 5.2 %
HCT VFR BLD CALC: 40.5 %
HDLC SERPL-MCNC: 70 MG/DL
HGB BLD-MCNC: 13 G/DL
IMM GRANULOCYTES NFR BLD AUTO: 0.3 %
KETONES URINE: NEGATIVE MG/DL
LDLC SERPL CALC-MCNC: 96 MG/DL
LEUKOCYTE ESTERASE URINE: ABNORMAL
LYMPHOCYTES # BLD AUTO: 1.81 K/UL
LYMPHOCYTES NFR BLD AUTO: 23.7 %
MAN DIFF?: NORMAL
MCHC RBC-ENTMCNC: 29 PG
MCHC RBC-ENTMCNC: 32.1 GM/DL
MCV RBC AUTO: 90.4 FL
MICROSCOPIC-UA: NORMAL
MONOCYTES # BLD AUTO: 0.48 K/UL
MONOCYTES NFR BLD AUTO: 6.3 %
NEUTROPHILS # BLD AUTO: 5.22 K/UL
NEUTROPHILS NFR BLD AUTO: 68.1 %
NITRITE URINE: NEGATIVE
NONHDLC SERPL-MCNC: 114 MG/DL
PH URINE: 7.5
PLATELET # BLD AUTO: 235 K/UL
POTASSIUM SERPL-SCNC: 4.1 MMOL/L
PROT SERPL-MCNC: 7.2 G/DL
PROTEIN URINE: NEGATIVE MG/DL
RBC # BLD: 4.48 M/UL
RBC # FLD: 12.2 %
RED BLOOD CELLS URINE: 0 /HPF
SODIUM SERPL-SCNC: 140 MMOL/L
SPECIFIC GRAVITY URINE: 1.01
TRIGL SERPL-MCNC: 100 MG/DL
TSH SERPL-ACNC: 0.66 UIU/ML
UROBILINOGEN URINE: 0.2 MG/DL
WBC # FLD AUTO: 7.65 K/UL
WHITE BLOOD CELLS URINE: 1 /HPF

## 2024-05-07 ENCOUNTER — NON-APPOINTMENT (OUTPATIENT)
Age: 44
End: 2024-05-07

## 2024-12-14 ENCOUNTER — NON-APPOINTMENT (OUTPATIENT)
Age: 44
End: 2024-12-14

## 2024-12-15 ENCOUNTER — NON-APPOINTMENT (OUTPATIENT)
Age: 44
End: 2024-12-15

## 2024-12-24 ENCOUNTER — LABORATORY RESULT (OUTPATIENT)
Age: 44
End: 2024-12-24

## 2024-12-24 ENCOUNTER — APPOINTMENT (OUTPATIENT)
Dept: OBGYN | Facility: CLINIC | Age: 44
End: 2024-12-24

## 2024-12-24 ENCOUNTER — ASOB RESULT (OUTPATIENT)
Age: 44
End: 2024-12-24

## 2024-12-24 VITALS
BODY MASS INDEX: 28.89 KG/M2 | SYSTOLIC BLOOD PRESSURE: 98 MMHG | DIASTOLIC BLOOD PRESSURE: 64 MMHG | WEIGHT: 153 LBS | HEIGHT: 61 IN

## 2024-12-24 DIAGNOSIS — Z01.419 ENCOUNTER FOR GYNECOLOGICAL EXAMINATION (GENERAL) (ROUTINE) W/OUT ABNORMAL FINDINGS: ICD-10-CM

## 2024-12-24 DIAGNOSIS — R63.5 ABNORMAL WEIGHT GAIN: ICD-10-CM

## 2024-12-24 PROCEDURE — 99459 PELVIC EXAMINATION: CPT

## 2024-12-24 PROCEDURE — G0444 DEPRESSION SCREEN ANNUAL: CPT | Mod: 59

## 2024-12-24 PROCEDURE — 99396 PREV VISIT EST AGE 40-64: CPT

## 2024-12-27 PROBLEM — R63.5 WEIGHT GAIN: Status: ACTIVE | Noted: 2024-12-27

## 2024-12-28 LAB
ALBUMIN SERPL ELPH-MCNC: 4.5 G/DL
ALP BLD-CCNC: 70 U/L
ALT SERPL-CCNC: 22 U/L
ANION GAP SERPL CALC-SCNC: 12 MMOL/L
AST SERPL-CCNC: 25 U/L
BILIRUB SERPL-MCNC: 0.4 MG/DL
BUN SERPL-MCNC: 14 MG/DL
C TRACH RRNA SPEC QL NAA+PROBE: NOT DETECTED
CALCIUM SERPL-MCNC: 9.5 MG/DL
CHLORIDE SERPL-SCNC: 101 MMOL/L
CO2 SERPL-SCNC: 26 MMOL/L
CREAT SERPL-MCNC: 0.67 MG/DL
EGFR: 109 ML/MIN/1.73M2
ESTIMATED AVERAGE GLUCOSE: 105 MG/DL
FERRITIN SERPL-MCNC: 63 NG/ML
FOLATE SERPL-MCNC: 10.7 NG/ML
FSH SERPL-MCNC: 6.4 IU/L
GLUCOSE SERPL-MCNC: 95 MG/DL
HBA1C MFR BLD HPLC: 5.3 %
HCT VFR BLD CALC: 40.9 %
HGB BLD-MCNC: 13.1 G/DL
IRON SATN MFR SERPL: 36 %
IRON SERPL-MCNC: 100 UG/DL
LH SERPL-ACNC: 14.6 IU/L
MCHC RBC-ENTMCNC: 29.1 PG
MCHC RBC-ENTMCNC: 32 G/DL
MCV RBC AUTO: 90.9 FL
N GONORRHOEA RRNA SPEC QL NAA+PROBE: NOT DETECTED
PLATELET # BLD AUTO: 230 K/UL
POTASSIUM SERPL-SCNC: 4.2 MMOL/L
PROGEST SERPL-MCNC: 0.3 NG/ML
PROLACTIN SERPL-MCNC: 7.3 NG/ML
PROT SERPL-MCNC: 7.4 G/DL
RBC # BLD: 4.5 M/UL
RBC # FLD: 12.5 %
SODIUM SERPL-SCNC: 139 MMOL/L
SOURCE AMPLIFICATION: NORMAL
T4 FREE SERPL-MCNC: 1.1 NG/DL
TESTOST FREE SERPL-MCNC: 2 PG/ML
TESTOST SERPL-MCNC: 46.7 NG/DL
TIBC SERPL-MCNC: 282 UG/DL
TSH SERPL-ACNC: 0.78 UIU/ML
UIBC SERPL-MCNC: 181 UG/DL
VIT B12 SERPL-MCNC: 464 PG/ML
WBC # FLD AUTO: 7.36 K/UL

## 2024-12-29 LAB — ESTRADIOL SERPL-MCNC: 68 PG/ML

## 2025-01-02 LAB
25(OH)D3 SERPL-MCNC: 21.3 NG/ML
ANTI-MUELLERIAN HORMONE: 3.17 NG/ML
CYTOLOGY CVX/VAG DOC THIN PREP: NORMAL
DHEA-S SERPL-MCNC: 258 UG/DL
HPV HIGH+LOW RISK DNA PNL CVX: DETECTED

## 2025-01-06 ENCOUNTER — TRANSCRIPTION ENCOUNTER (OUTPATIENT)
Age: 45
End: 2025-01-06

## 2025-05-19 ENCOUNTER — APPOINTMENT (OUTPATIENT)
Dept: FAMILY MEDICINE | Facility: CLINIC | Age: 45
End: 2025-05-19
Payer: COMMERCIAL

## 2025-05-19 VITALS
OXYGEN SATURATION: 99 % | RESPIRATION RATE: 16 BRPM | DIASTOLIC BLOOD PRESSURE: 76 MMHG | HEIGHT: 61 IN | TEMPERATURE: 97.6 F | HEART RATE: 74 BPM | WEIGHT: 152 LBS | BODY MASS INDEX: 28.7 KG/M2 | SYSTOLIC BLOOD PRESSURE: 106 MMHG

## 2025-05-19 DIAGNOSIS — Z00.00 ENCOUNTER FOR GENERAL ADULT MEDICAL EXAMINATION W/OUT ABNORMAL FINDINGS: ICD-10-CM

## 2025-05-19 DIAGNOSIS — E88.819 INSULIN RESISTANCE, UNSPECIFIED: ICD-10-CM

## 2025-05-19 DIAGNOSIS — E04.9 NONTOXIC GOITER, UNSPECIFIED: ICD-10-CM

## 2025-05-19 PROCEDURE — 99396 PREV VISIT EST AGE 40-64: CPT

## 2025-05-20 LAB
25(OH)D3 SERPL-MCNC: 20.3 NG/ML
ALBUMIN SERPL ELPH-MCNC: 4.3 G/DL
ALP BLD-CCNC: 62 U/L
ALT SERPL-CCNC: 19 U/L
ANION GAP SERPL CALC-SCNC: 13 MMOL/L
AST SERPL-CCNC: 21 U/L
BASOPHILS # BLD AUTO: 0.02 K/UL
BASOPHILS NFR BLD AUTO: 0.4 %
BILIRUB SERPL-MCNC: 0.4 MG/DL
BUN SERPL-MCNC: 12 MG/DL
CALCIUM SERPL-MCNC: 9.6 MG/DL
CHLORIDE SERPL-SCNC: 103 MMOL/L
CHOLEST SERPL-MCNC: 191 MG/DL
CO2 SERPL-SCNC: 24 MMOL/L
CREAT SERPL-MCNC: 0.66 MG/DL
EGFRCR SERPLBLD CKD-EPI 2021: 109 ML/MIN/1.73M2
EOSINOPHIL # BLD AUTO: 0.04 K/UL
EOSINOPHIL NFR BLD AUTO: 0.8 %
ESTIMATED AVERAGE GLUCOSE: 108 MG/DL
GLUCOSE SERPL-MCNC: 103 MG/DL
HBA1C MFR BLD HPLC: 5.4 %
HCT VFR BLD CALC: 39.6 %
HDLC SERPL-MCNC: 63 MG/DL
HGB BLD-MCNC: 12.9 G/DL
IMM GRANULOCYTES NFR BLD AUTO: 0.2 %
INSULIN SERPL-MCNC: 13.7 UU/ML
LDLC SERPL-MCNC: 115 MG/DL
LYMPHOCYTES # BLD AUTO: 1.6 K/UL
LYMPHOCYTES NFR BLD AUTO: 32.7 %
MAN DIFF?: NORMAL
MCHC RBC-ENTMCNC: 29.6 PG
MCHC RBC-ENTMCNC: 32.6 G/DL
MCV RBC AUTO: 90.8 FL
MONOCYTES # BLD AUTO: 0.34 K/UL
MONOCYTES NFR BLD AUTO: 6.9 %
NEUTROPHILS # BLD AUTO: 2.89 K/UL
NEUTROPHILS NFR BLD AUTO: 59 %
NONHDLC SERPL-MCNC: 128 MG/DL
PLATELET # BLD AUTO: 227 K/UL
POTASSIUM SERPL-SCNC: 4.3 MMOL/L
PROT SERPL-MCNC: 7 G/DL
RBC # BLD: 4.36 M/UL
RBC # FLD: 12.3 %
SODIUM SERPL-SCNC: 141 MMOL/L
TRIGL SERPL-MCNC: 69 MG/DL
TSH SERPL-ACNC: 1.13 UIU/ML
WBC # FLD AUTO: 4.9 K/UL

## 2025-07-16 PROBLEM — R92.8 ABNORMAL MAMMOGRAM: Status: ACTIVE | Noted: 2025-07-16

## 2025-09-05 ENCOUNTER — NON-APPOINTMENT (OUTPATIENT)
Age: 45
End: 2025-09-05